# Patient Record
Sex: FEMALE | Race: WHITE | NOT HISPANIC OR LATINO | URBAN - METROPOLITAN AREA
[De-identification: names, ages, dates, MRNs, and addresses within clinical notes are randomized per-mention and may not be internally consistent; named-entity substitution may affect disease eponyms.]

---

## 2022-09-26 ENCOUNTER — OFFICE (OUTPATIENT)
Dept: URBAN - METROPOLITAN AREA CLINIC 19 | Facility: CLINIC | Age: 37
End: 2022-09-26

## 2022-09-26 VITALS
OXYGEN SATURATION: 97 % | HEART RATE: 91 BPM | DIASTOLIC BLOOD PRESSURE: 74 MMHG | HEIGHT: 61 IN | SYSTOLIC BLOOD PRESSURE: 120 MMHG | WEIGHT: 178 LBS

## 2022-09-26 DIAGNOSIS — R11.2 NAUSEA WITH VOMITING, UNSPECIFIED: ICD-10-CM

## 2022-09-26 DIAGNOSIS — R19.4 CHANGE IN BOWEL HABIT: ICD-10-CM

## 2022-09-26 DIAGNOSIS — R12 HEARTBURN: ICD-10-CM

## 2022-09-26 DIAGNOSIS — R49.0 DYSPHONIA: ICD-10-CM

## 2022-09-26 DIAGNOSIS — R63.4 ABNORMAL WEIGHT LOSS: ICD-10-CM

## 2022-09-26 LAB
C-REACTIVE PROTEIN, QUANT: <1 MG/L
CBC, PLATELET, NO DIFFERENTIAL: HEMATOCRIT: 44.9 % (ref 34–46.6)
CBC, PLATELET, NO DIFFERENTIAL: HEMOGLOBIN: 13.8 G/DL (ref 11.1–15.9)
CBC, PLATELET, NO DIFFERENTIAL: MCH: 27.9 PG (ref 26.6–33)
CBC, PLATELET, NO DIFFERENTIAL: MCHC: 30.7 G/DL — LOW (ref 31.5–35.7)
CBC, PLATELET, NO DIFFERENTIAL: MCV: 91 FL (ref 79–97)
CBC, PLATELET, NO DIFFERENTIAL: PLATELETS: 296 X10E3/UL (ref 150–450)
CBC, PLATELET, NO DIFFERENTIAL: RBC: 4.95 X10E6/UL (ref 3.77–5.28)
CBC, PLATELET, NO DIFFERENTIAL: RDW: 12.9 % (ref 11.7–15.4)
CBC, PLATELET, NO DIFFERENTIAL: WBC: 6 X10E3/UL (ref 3.4–10.8)
CELIAC DISEASE COMPREHENSIVE: DEAMIDATED GLIADIN ABS, IGA: 9 UNITS (ref 0–19)
CELIAC DISEASE COMPREHENSIVE: DEAMIDATED GLIADIN ABS, IGG: 4 UNITS (ref 0–19)
CELIAC DISEASE COMPREHENSIVE: ENDOMYSIAL ANTIBODY IGA: NEGATIVE
CELIAC DISEASE COMPREHENSIVE: IMMUNOGLOBULIN A, QN, SERUM: 246 MG/DL (ref 87–352)
CELIAC DISEASE COMPREHENSIVE: T-TRANSGLUTAMINASE (TTG) IGA: <2 U/ML
CELIAC DISEASE COMPREHENSIVE: T-TRANSGLUTAMINASE (TTG) IGG: 2 U/ML (ref 0–5)
COMP. METABOLIC PANEL (14): A/G RATIO: 2.2 (ref 1.2–2.2)
COMP. METABOLIC PANEL (14): ALBUMIN: 5.2 G/DL — HIGH (ref 3.8–4.8)
COMP. METABOLIC PANEL (14): ALKALINE PHOSPHATASE: 68 IU/L (ref 44–121)
COMP. METABOLIC PANEL (14): ALT (SGPT): 9 IU/L (ref 0–32)
COMP. METABOLIC PANEL (14): AST (SGOT): 12 IU/L (ref 0–40)
COMP. METABOLIC PANEL (14): BILIRUBIN, TOTAL: 0.4 MG/DL (ref 0–1.2)
COMP. METABOLIC PANEL (14): BUN/CREATININE RATIO: 20 (ref 9–23)
COMP. METABOLIC PANEL (14): BUN: 11 MG/DL (ref 6–20)
COMP. METABOLIC PANEL (14): CALCIUM: 9.7 MG/DL (ref 8.7–10.2)
COMP. METABOLIC PANEL (14): CARBON DIOXIDE, TOTAL: 22 MMOL/L (ref 20–29)
COMP. METABOLIC PANEL (14): CHLORIDE: 101 MMOL/L (ref 96–106)
COMP. METABOLIC PANEL (14): CREATININE: 0.54 MG/DL — LOW (ref 0.57–1)
COMP. METABOLIC PANEL (14): EGFR: 124 ML/MIN/1.73 (ref 59–?)
COMP. METABOLIC PANEL (14): GLOBULIN, TOTAL: 2.4 G/DL (ref 1.5–4.5)
COMP. METABOLIC PANEL (14): GLUCOSE: 92 MG/DL (ref 70–99)
COMP. METABOLIC PANEL (14): POTASSIUM: 4.6 MMOL/L (ref 3.5–5.2)
COMP. METABOLIC PANEL (14): PROTEIN, TOTAL: 7.6 G/DL (ref 6–8.5)
COMP. METABOLIC PANEL (14): SODIUM: 141 MMOL/L (ref 134–144)
SEDIMENTATION RATE-WESTERGREN: 5 MM/HR (ref 0–32)
THYROXINE (T4) FREE, DIRECT: T4,FREE(DIRECT): 1.97 NG/DL — HIGH (ref 0.82–1.77)
TSH: 0.49 UIU/ML (ref 0.45–4.5)

## 2022-09-26 PROCEDURE — 99204 OFFICE O/P NEW MOD 45 MIN: CPT

## 2022-09-26 RX ORDER — ONDANSETRON 8 MG/1
TABLET, ORALLY DISINTEGRATING ORAL
Qty: 30 | Refills: 1 | Status: ACTIVE
Start: 2022-09-26

## 2022-09-26 NOTE — SERVICENOTES
The patient's assessment was reviewed with Dr. Dixon and a collaborative plan of care was established.

## 2022-09-26 NOTE — SERVICEHPINOTES
36-year-old  white female referred by her PCP, Yoandy Arboleda DNP for complaints of intermittent nausea and vomiting, heartburn and change in bowel habits over the last 3-4 weeks.  Symptoms began abruptly and occurred 1st thing in the morning.  She initially thought it was a virus, but symptoms continue to persist or worsen.  She has actually gone to the ED on 3 different occasions at [facility] 9/5/22, 9/7/22 and 9/11/22.  Lab work is been largely unremarkable and AUS 9/11/22 was also normal.  She had a pelvic ultrasound 9/5/22 and she was concerned her IUD might be causing some of her symptoms.  She is actually going tomorrow to a gynecologist, Wanda Ortiz MD to have her IUD removed.  Usually her symptoms are worse 1st thing in the morning.  A symptoms resolve throughout the day, but wax and wane.  She also has chronic hoarseness for which she has actually seen ENT who started her on pantoprazole 40 mg daily for chronic reflux and heartburn.  She denies any significant improvement in her symptoms with this.  She does take ibuprofen on a fairly regular basis for pain in her hands.  She denies dysphagia or overt GI bleeding.  Her bowel movements do fluctuate between diarrhea and constipation with diarrhea being more predominant.  Prior to this, she denies any GI issues or previous GI tests or studies.  Family history is negative for IBD and colon neoplasm.